# Patient Record
Sex: FEMALE | Race: WHITE | URBAN - METROPOLITAN AREA
[De-identification: names, ages, dates, MRNs, and addresses within clinical notes are randomized per-mention and may not be internally consistent; named-entity substitution may affect disease eponyms.]

---

## 2023-07-06 PROBLEM — M54.50 CHRONIC BILATERAL LOW BACK PAIN WITHOUT SCIATICA: Status: ACTIVE | Noted: 2017-05-30

## 2023-07-06 PROBLEM — S92.502A FRACTURE OF FOURTH TOE, LEFT, CLOSED: Status: ACTIVE | Noted: 2023-07-06

## 2023-07-06 PROBLEM — G62.9 PERIPHERAL NEUROPATHY: Status: ACTIVE | Noted: 2023-07-06

## 2023-07-06 PROBLEM — G89.29 CHRONIC BILATERAL LOW BACK PAIN WITHOUT SCIATICA: Status: ACTIVE | Noted: 2017-05-30

## 2023-07-06 PROBLEM — M25.561 ACUTE PAIN OF RIGHT KNEE: Status: ACTIVE | Noted: 2017-12-08

## 2023-07-06 PROBLEM — M43.07 SPONDYLOLYSIS, LUMBOSACRAL: Status: ACTIVE | Noted: 2023-07-06

## 2023-07-06 PROBLEM — R10.32 LEFT GROIN PAIN: Status: ACTIVE | Noted: 2017-03-28

## 2023-07-06 PROBLEM — E66.9 OBESITY: Status: ACTIVE | Noted: 2023-07-06

## 2023-07-06 PROBLEM — M70.50 PES ANSERINE BURSITIS: Status: ACTIVE | Noted: 2017-12-08

## 2023-07-06 PROBLEM — H10.10 ALLERGIC CONJUNCTIVITIS: Status: ACTIVE | Noted: 2023-07-06

## 2023-07-06 PROBLEM — E55.9 VITAMIN D DEFICIENCY: Status: ACTIVE | Noted: 2023-07-06

## 2023-07-06 PROBLEM — J45.20 INTERMITTENT ASTHMA: Status: ACTIVE | Noted: 2023-07-06

## 2023-07-06 PROBLEM — M43.17 SPONDYLOLISTHESIS AT L5-S1 LEVEL: Status: ACTIVE | Noted: 2023-07-06

## 2023-07-06 RX ORDER — LANOLIN ALCOHOL/MO/W.PET/CERES
100 CREAM (GRAM) TOPICAL DAILY
COMMUNITY
Start: 2022-11-01

## 2023-07-06 RX ORDER — TRANEXAMIC ACID 650 MG/1
TABLET ORAL
COMMUNITY
End: 2023-07-10

## 2023-07-06 RX ORDER — IBUPROFEN 800 MG/1
800 TABLET ORAL EVERY 8 HOURS PRN
COMMUNITY
Start: 2023-05-03

## 2023-07-06 RX ORDER — LORATADINE 10 MG/1
10 TABLET ORAL
COMMUNITY
End: 2023-07-10

## 2023-07-06 RX ORDER — FEXOFENADINE HCL 180 MG/1
180 TABLET ORAL DAILY
COMMUNITY

## 2023-07-06 RX ORDER — CYANOCOBALAMIN 1000 UG/ML
1000 INJECTION, SOLUTION INTRAMUSCULAR; SUBCUTANEOUS
COMMUNITY
Start: 2021-07-08

## 2023-07-10 ENCOUNTER — OFFICE VISIT (OUTPATIENT)
Age: 43
End: 2023-07-10
Payer: COMMERCIAL

## 2023-07-10 VITALS
HEIGHT: 67 IN | DIASTOLIC BLOOD PRESSURE: 81 MMHG | SYSTOLIC BLOOD PRESSURE: 132 MMHG | WEIGHT: 281 LBS | BODY MASS INDEX: 44.1 KG/M2 | TEMPERATURE: 97.7 F | RESPIRATION RATE: 16 BRPM | HEART RATE: 98 BPM

## 2023-07-10 DIAGNOSIS — Z00.00 ENCOUNTER FOR PREVENTIVE HEALTH EXAMINATION: Primary | ICD-10-CM

## 2023-07-10 DIAGNOSIS — E66.01 CLASS 3 SEVERE OBESITY DUE TO EXCESS CALORIES WITH SERIOUS COMORBIDITY AND BODY MASS INDEX (BMI) OF 40.0 TO 44.9 IN ADULT (HCC): ICD-10-CM

## 2023-07-10 DIAGNOSIS — R60.9 EDEMA, UNSPECIFIED TYPE: ICD-10-CM

## 2023-07-10 PROBLEM — G62.9 PERIPHERAL NEUROPATHY: Status: RESOLVED | Noted: 2023-07-06 | Resolved: 2023-07-10

## 2023-07-10 PROCEDURE — 99396 PREV VISIT EST AGE 40-64: CPT | Performed by: FAMILY MEDICINE

## 2023-07-10 RX ORDER — FAMOTIDINE 40 MG/1
40 TABLET, FILM COATED ORAL DAILY
COMMUNITY

## 2023-07-10 RX ORDER — ACETAMINOPHEN 325 MG/1
650 TABLET ORAL EVERY 6 HOURS PRN
COMMUNITY

## 2023-07-10 RX ORDER — SUMATRIPTAN 50 MG/1
50 TABLET, FILM COATED ORAL
COMMUNITY

## 2023-07-10 RX ORDER — FUROSEMIDE 20 MG/1
20 TABLET ORAL DAILY
Qty: 60 TABLET | Refills: 3 | Status: SHIPPED | OUTPATIENT
Start: 2023-07-10

## 2023-07-10 RX ORDER — LANOLIN ALCOHOL/MO/W.PET/CERES
400 CREAM (GRAM) TOPICAL DAILY
COMMUNITY

## 2023-07-10 SDOH — ECONOMIC STABILITY: FOOD INSECURITY: WITHIN THE PAST 12 MONTHS, YOU WORRIED THAT YOUR FOOD WOULD RUN OUT BEFORE YOU GOT MONEY TO BUY MORE.: NEVER TRUE

## 2023-07-10 SDOH — ECONOMIC STABILITY: FOOD INSECURITY: WITHIN THE PAST 12 MONTHS, THE FOOD YOU BOUGHT JUST DIDN'T LAST AND YOU DIDN'T HAVE MONEY TO GET MORE.: NEVER TRUE

## 2023-07-10 SDOH — ECONOMIC STABILITY: HOUSING INSECURITY
IN THE LAST 12 MONTHS, WAS THERE A TIME WHEN YOU DID NOT HAVE A STEADY PLACE TO SLEEP OR SLEPT IN A SHELTER (INCLUDING NOW)?: NO

## 2023-07-10 SDOH — ECONOMIC STABILITY: INCOME INSECURITY: HOW HARD IS IT FOR YOU TO PAY FOR THE VERY BASICS LIKE FOOD, HOUSING, MEDICAL CARE, AND HEATING?: NOT HARD AT ALL

## 2023-07-10 ASSESSMENT — PATIENT HEALTH QUESTIONNAIRE - PHQ9
2. FEELING DOWN, DEPRESSED OR HOPELESS: 0
SUM OF ALL RESPONSES TO PHQ9 QUESTIONS 1 & 2: 0
1. LITTLE INTEREST OR PLEASURE IN DOING THINGS: 0
SUM OF ALL RESPONSES TO PHQ QUESTIONS 1-9: 0

## 2023-07-10 ASSESSMENT — ENCOUNTER SYMPTOMS: BACK PAIN: 1

## 2023-08-09 PROBLEM — Z00.00 ENCOUNTER FOR PREVENTIVE HEALTH EXAMINATION: Status: RESOLVED | Noted: 2023-07-10 | Resolved: 2023-08-09

## 2023-08-23 NOTE — PROGRESS NOTES
[facility]  [address] [facility], [facility]   Phone: [phone]  Fax: [phone]       Date of Visit:  2023  Patient Name: Alfreda Valdovinos   Patient :  1980     CHIEF COMPLAINT:     Alfreda Valdovinos is a 39 y.o. female who presents today for an general visit to be evaluated for the following condition(s):  Chief Complaint   Patient presents with    Knee Pain     Left posterior knee pain--stiff and tight- pain with bending it-no known injury    Foot Pain     Right foot pain-saw Dr Pipe Duvall and MRI completed==>arthritis=will be restarting PT       REVIEW OF SYSTEM      Review of Systems   Constitutional:  Positive for fatigue. Negative for activity change. HENT:  Positive for congestion. Cardiovascular:  Negative for leg swelling. Musculoskeletal:  Positive for arthralgias, back pain and gait problem. Pain of the left knee both medial and posterior as well as right heel and arch that is mild but with swelling and some pain of the right ankle   Hematological:  Bruises/bleeds easily. Psychiatric/Behavioral:  Positive for sleep disturbance. HISTORY OF PRESENT ILLNESS     HPI    39 y/0 female here today for follow lab work. She also wanted to discuss recent consultation she has had with her podiatrist as well as other regarding right foot and ankle pain and swelling in addition to the left knee pain. Lab work was discussed at [facility] and questions were answered. Patient has been seen for some time by her podiatrist.  She has had orthotics made. She is fully aware that the spur of her right foot which was bothering her a great deal changed her gait for  some time. She is attempting to normalize her gait. It was about this time that the right ankle was also having more discomfort and swelling as well as pain of the left knee.       She endorsed that prior to her orthotics she had

## 2023-08-24 ENCOUNTER — OFFICE VISIT (OUTPATIENT)
Age: 43
End: 2023-08-24
Payer: COMMERCIAL

## 2023-08-24 VITALS
HEIGHT: 67 IN | DIASTOLIC BLOOD PRESSURE: 69 MMHG | SYSTOLIC BLOOD PRESSURE: 130 MMHG | WEIGHT: 285.2 LBS | TEMPERATURE: 97.8 F | HEART RATE: 93 BPM | BODY MASS INDEX: 44.76 KG/M2 | RESPIRATION RATE: 16 BRPM

## 2023-08-24 DIAGNOSIS — M25.562 ACUTE PAIN OF LEFT KNEE: Primary | ICD-10-CM

## 2023-08-24 DIAGNOSIS — M19.071 ARTHRITIS OF RIGHT ANKLE: ICD-10-CM

## 2023-08-24 DIAGNOSIS — M77.31 CALCANEAL SPUR OF FOOT, RIGHT: ICD-10-CM

## 2023-08-24 PROCEDURE — 99213 OFFICE O/P EST LOW 20 MIN: CPT | Performed by: FAMILY MEDICINE

## 2023-08-24 RX ORDER — MELOXICAM 15 MG/1
15 TABLET ORAL DAILY
Qty: 30 TABLET | Refills: 3 | Status: SHIPPED | OUTPATIENT
Start: 2023-08-24

## 2023-08-24 ASSESSMENT — LIFESTYLE VARIABLES
HOW MANY STANDARD DRINKS CONTAINING ALCOHOL DO YOU HAVE ON A TYPICAL DAY: 1 OR 2
HOW OFTEN DO YOU HAVE A DRINK CONTAINING ALCOHOL: 2-4 TIMES A MONTH

## 2023-08-24 NOTE — ASSESSMENT & PLAN NOTE
Use the Mobic for 2 weeks minimum . Then as it gets better stop or wean as allowed. Attention to gait and start stretching.    Short arc and quad set as shown

## 2023-08-24 NOTE — PATIENT INSTRUCTIONS
Your gait is what is causing and will cause increase in issues over time. Need to normalize your gait so that you are no rolling you weight across your foot but through the foot from heel to the 2nd toe. This should help equal leg length. Then we need to address the inflammation of the left knee related to walking with slight flexion. Strengthen the anterior portion of the knee with short arc and quad sets and work on the flexibility of the hamstring.       Normalization of gait with the orthotics as well as water or aquatic therapy would be ideal.

## 2023-08-29 PROBLEM — M77.31 CALCANEAL SPUR OF FOOT, RIGHT: Status: ACTIVE | Noted: 2023-08-29

## 2023-08-29 PROBLEM — M19.071 ARTHRITIS OF RIGHT ANKLE: Status: ACTIVE | Noted: 2023-08-29

## 2023-08-29 ASSESSMENT — ENCOUNTER SYMPTOMS: BACK PAIN: 1

## 2023-08-29 NOTE — ASSESSMENT & PLAN NOTE
Discussed the issue of how there is cartilage on the top of the ankle and foot rub. The swelling might continue as you adjust to the change in gait.

## 2023-11-14 NOTE — PROGRESS NOTES
Musculoskeletal:      Comments: Mild warmth of the left knee anterior and lateral in comparison to the right. No significant swelling. No significant medial joint line tenderness however tenderness of the inferolateral and superior portion of the patella. No obvious effusion. Neurological:      Mental Status: She is alert. ASSESSMENT/PLAN     1. Chronic pain of left knee  Assessment & Plan:   Persistent knee pain despite physical therapy and nonsteroidal.  Right foot and ankle seem to have improved however her left knee has not changed. Patient requesting referral to [facility] orthopedics. Her sister and mother had procedures performed by Dr. Cristobal fox and she would prefer to have either him or one of his group evaluate her at this time. Orders:  -     XR KNEE LEFT (1-2 VIEWS); Future  -     MRI KNEE LEFT WO CONTRAST; Future  -     External Referral To Orthopaedic Surgery       Return in about 3 months (around 2/15/2024) for Review progress of knee pain. .    - Questions/concerns answered. Patient verbalized and expressed understanding. Medications, laboratory testing, imaging, consultation, and follow up as documented in this encounter. Please be aware portions of this note were completed using voice recognition software and unforeseen errors may have occurred    I personally reviewed the patient's past medical history, current medications, allergies, surgical history, family history and social history. Updates were made as necessary.     Electronically signed by Brianna Paulson MD on 11/15/2023 at 1:22 PM

## 2023-11-15 ENCOUNTER — OFFICE VISIT (OUTPATIENT)
Age: 43
End: 2023-11-15
Payer: COMMERCIAL

## 2023-11-15 VITALS
OXYGEN SATURATION: 99 % | SYSTOLIC BLOOD PRESSURE: 132 MMHG | WEIGHT: 289.2 LBS | RESPIRATION RATE: 16 BRPM | DIASTOLIC BLOOD PRESSURE: 89 MMHG | HEART RATE: 80 BPM | HEIGHT: 67 IN | BODY MASS INDEX: 45.39 KG/M2 | TEMPERATURE: 98.3 F

## 2023-11-15 DIAGNOSIS — M25.562 CHRONIC PAIN OF LEFT KNEE: Primary | ICD-10-CM

## 2023-11-15 DIAGNOSIS — G89.29 CHRONIC PAIN OF LEFT KNEE: Primary | ICD-10-CM

## 2023-11-15 PROCEDURE — 99213 OFFICE O/P EST LOW 20 MIN: CPT | Performed by: FAMILY MEDICINE

## 2023-11-15 PROCEDURE — G8417 CALC BMI ABV UP PARAM F/U: HCPCS | Performed by: FAMILY MEDICINE

## 2023-11-15 PROCEDURE — 1036F TOBACCO NON-USER: CPT | Performed by: FAMILY MEDICINE

## 2023-11-15 PROCEDURE — G8484 FLU IMMUNIZE NO ADMIN: HCPCS | Performed by: FAMILY MEDICINE

## 2023-11-15 PROCEDURE — G8427 DOCREV CUR MEDS BY ELIG CLIN: HCPCS | Performed by: FAMILY MEDICINE

## 2023-11-15 ASSESSMENT — PATIENT HEALTH QUESTIONNAIRE - PHQ9
SUM OF ALL RESPONSES TO PHQ QUESTIONS 1-9: 0
2. FEELING DOWN, DEPRESSED OR HOPELESS: 0
SUM OF ALL RESPONSES TO PHQ QUESTIONS 1-9: 0
SUM OF ALL RESPONSES TO PHQ QUESTIONS 1-9: 0
SUM OF ALL RESPONSES TO PHQ9 QUESTIONS 1 & 2: 0
1. LITTLE INTEREST OR PLEASURE IN DOING THINGS: 0
SUM OF ALL RESPONSES TO PHQ QUESTIONS 1-9: 0

## 2023-11-15 ASSESSMENT — ENCOUNTER SYMPTOMS
BLOOD IN STOOL: 0
ABDOMINAL PAIN: 0
DIARRHEA: 0
NAUSEA: 1
BACK PAIN: 1

## 2023-11-15 NOTE — ASSESSMENT & PLAN NOTE
Persistent knee pain despite physical therapy and nonsteroidal.  Right foot and ankle seem to have improved however her left knee has not changed. Patient requesting referral to [facility] orthopedics. Her sister and mother had procedures performed by Dr. Brenna fox and she would prefer to have either him or one of his group evaluate her at this time.

## 2023-11-15 NOTE — PATIENT INSTRUCTIONS
Patient is to continue to take the meloxicam and agree with taking the famotidine or Pepcid along with the meloxicam at bedtime to prevent GI side effects. Continue to do the home exercise program.  We will also make the referral to the orthopedic group at [facility] per her preference. We will order plain films in addition to MRI of the left knee.

## 2023-11-22 DIAGNOSIS — M25.562 CHRONIC PAIN OF LEFT KNEE: ICD-10-CM

## 2023-11-22 DIAGNOSIS — G89.29 CHRONIC PAIN OF LEFT KNEE: ICD-10-CM

## 2023-12-27 ENCOUNTER — OFFICE VISIT (OUTPATIENT)
Age: 43
End: 2023-12-27
Payer: COMMERCIAL

## 2023-12-27 VITALS
HEART RATE: 90 BPM | WEIGHT: 287 LBS | TEMPERATURE: 97.1 F | DIASTOLIC BLOOD PRESSURE: 71 MMHG | RESPIRATION RATE: 16 BRPM | SYSTOLIC BLOOD PRESSURE: 135 MMHG | BODY MASS INDEX: 44.95 KG/M2

## 2023-12-27 DIAGNOSIS — H60.393 OTHER INFECTIVE ACUTE OTITIS EXTERNA OF BOTH EARS: ICD-10-CM

## 2023-12-27 DIAGNOSIS — B96.89 ACUTE BACTERIAL SINUSITIS: Primary | ICD-10-CM

## 2023-12-27 DIAGNOSIS — Z78.9 NON-SMOKER: ICD-10-CM

## 2023-12-27 DIAGNOSIS — J01.90 ACUTE BACTERIAL SINUSITIS: Primary | ICD-10-CM

## 2023-12-27 DIAGNOSIS — H57.89 EYE DRAINAGE: ICD-10-CM

## 2023-12-27 PROBLEM — H60.90 OTITIS EXTERNA: Status: ACTIVE | Noted: 2023-12-27

## 2023-12-27 PROCEDURE — 4130F TOPICAL PREP RX AOE: CPT

## 2023-12-27 PROCEDURE — 1036F TOBACCO NON-USER: CPT

## 2023-12-27 PROCEDURE — G8484 FLU IMMUNIZE NO ADMIN: HCPCS

## 2023-12-27 PROCEDURE — 99212 OFFICE O/P EST SF 10 MIN: CPT

## 2023-12-27 PROCEDURE — 99214 OFFICE O/P EST MOD 30 MIN: CPT

## 2023-12-27 PROCEDURE — G8417 CALC BMI ABV UP PARAM F/U: HCPCS

## 2023-12-27 PROCEDURE — G8427 DOCREV CUR MEDS BY ELIG CLIN: HCPCS

## 2023-12-27 RX ORDER — AMOXICILLIN AND CLAVULANATE POTASSIUM 875; 125 MG/1; MG/1
1 TABLET, FILM COATED ORAL 2 TIMES DAILY
Qty: 20 TABLET | Refills: 0 | Status: SHIPPED | OUTPATIENT
Start: 2023-12-27 | End: 2024-01-06

## 2023-12-27 NOTE — PATIENT INSTRUCTIONS
Thank you for coming into the clinic today locate the  -As discussed we will be sending an oral antibiotic called Augmentin for you to take for 10 days. Please call let us know if your sinus infection was not improving after antibiotic course so that we can renew prescriptions for another 10 days. If it continues to not improve we will increase the Augmentin dose and refer you to ENT  -We will be sending an eardrop for you to take for a minimum of 7 days and for up to 10 days if there is no complete resolution after 7 days  - Use claritin or zyrtec OTC for drainage, tylenol for discomfort and/or aleve. Mucinex or robitussin DM for congested cough. Steam inhalation 10-20 minutes one or 2 times a day will help with the congestion more than anything (boil up some water, turn it off, towel over your head, don't burn your face!)   -The best treatment for sinusitis sinus rinses I have attached a copy for you to review at your convenience  -Please feel free to send us any nonurgent medical questions through my portal or call to speak with the on-call physician for more urgent medical

## 2023-12-28 NOTE — ASSESSMENT & PLAN NOTE
-This is likely drainage from clogged sinuses, should be resolved with oral antibiotics, no eyedrops indicated at this time

## 2023-12-28 NOTE — ASSESSMENT & PLAN NOTE
-Prescribed amoxicillin-clavulanate (AUGMENTIN) 875-125 MG per tablet twice daily for 10 days  -Recommended sinus rinses, patient refused this as she is unable to tolerate it  -Recommend steam inhalation  -Started 2 weeks ago with yellow/green mucus

## 2024-01-22 ENCOUNTER — OFFICE VISIT (OUTPATIENT)
Age: 44
End: 2024-01-22
Payer: COMMERCIAL

## 2024-01-22 ENCOUNTER — HOSPITAL ENCOUNTER (OUTPATIENT)
Age: 44
Setting detail: SPECIMEN
Discharge: HOME | End: 2024-01-22

## 2024-01-22 VITALS
HEART RATE: 85 BPM | HEIGHT: 67 IN | DIASTOLIC BLOOD PRESSURE: 86 MMHG | WEIGHT: 287.4 LBS | RESPIRATION RATE: 16 BRPM | TEMPERATURE: 98 F | BODY MASS INDEX: 45.11 KG/M2 | SYSTOLIC BLOOD PRESSURE: 136 MMHG

## 2024-01-22 DIAGNOSIS — E66.01 CLASS 3 SEVERE OBESITY DUE TO EXCESS CALORIES WITHOUT SERIOUS COMORBIDITY WITH BODY MASS INDEX (BMI) OF 40.0 TO 44.9 IN ADULT (HCC): ICD-10-CM

## 2024-01-22 DIAGNOSIS — Z01.810 PRE-OPERATIVE CARDIOVASCULAR EXAMINATION: ICD-10-CM

## 2024-01-22 DIAGNOSIS — M17.12 PRIMARY OSTEOARTHRITIS OF LEFT KNEE: ICD-10-CM

## 2024-01-22 DIAGNOSIS — S83.207A TEAR OF MENISCUS OF LEFT KNEE, UNSPECIFIED MENISCUS, UNSPECIFIED TEAR TYPE, UNSPECIFIED WHETHER OLD OR CURRENT TEAR: ICD-10-CM

## 2024-01-22 DIAGNOSIS — D50.8 IRON DEFICIENCY ANEMIA DUE TO DIETARY CAUSES: ICD-10-CM

## 2024-01-22 DIAGNOSIS — Z98.84 HISTORY OF ROUX-EN-Y GASTRIC BYPASS: ICD-10-CM

## 2024-01-22 DIAGNOSIS — Z01.810 PRE-OPERATIVE CARDIOVASCULAR EXAMINATION: Primary | ICD-10-CM

## 2024-01-22 DIAGNOSIS — D68.2 FACTOR V DEFICIENCY (HCC): ICD-10-CM

## 2024-01-22 LAB
ALBUMIN SERPL-MCNC: 4.4 G/DL (ref 3.5–5.2)
ALBUMIN/GLOB SERPL: 2 {RATIO} (ref 1–2.5)
ALP SERPL-CCNC: 130 U/L (ref 35–104)
ALT SERPL-CCNC: 17 U/L (ref 10–35)
ANION GAP SERPL CALCULATED.3IONS-SCNC: 9 MMOL/L (ref 9–16)
AST SERPL-CCNC: 15 U/L (ref 10–35)
BILIRUB SERPL-MCNC: 0.5 MG/DL (ref 0–1.2)
BUN SERPL-MCNC: 17 MG/DL (ref 6–20)
CALCIUM SERPL-MCNC: 9.3 MG/DL (ref 8.6–10.4)
CHLORIDE SERPL-SCNC: 106 MMOL/L (ref 98–107)
CO2 SERPL-SCNC: 24 MMOL/L (ref 20–31)
CREAT SERPL-MCNC: 0.7 MG/DL (ref 0.5–0.9)
GFR SERPL CREATININE-BSD FRML MDRD: >60 ML/MIN/1.73M2
GLUCOSE SERPL-MCNC: 72 MG/DL (ref 74–99)
POTASSIUM SERPL-SCNC: 4.5 MMOL/L (ref 3.7–5.3)
PROT SERPL-MCNC: 7 G/DL (ref 6.6–8.7)
SODIUM SERPL-SCNC: 139 MMOL/L (ref 136–145)

## 2024-01-22 PROCEDURE — G8482 FLU IMMUNIZE ORDER/ADMIN: HCPCS | Performed by: FAMILY MEDICINE

## 2024-01-22 PROCEDURE — 99214 OFFICE O/P EST MOD 30 MIN: CPT | Performed by: STUDENT IN AN ORGANIZED HEALTH CARE EDUCATION/TRAINING PROGRAM

## 2024-01-22 PROCEDURE — 93005 ELECTROCARDIOGRAM TRACING: CPT | Performed by: STUDENT IN AN ORGANIZED HEALTH CARE EDUCATION/TRAINING PROGRAM

## 2024-01-22 PROCEDURE — G8417 CALC BMI ABV UP PARAM F/U: HCPCS | Performed by: FAMILY MEDICINE

## 2024-01-22 PROCEDURE — G8427 DOCREV CUR MEDS BY ELIG CLIN: HCPCS | Performed by: FAMILY MEDICINE

## 2024-01-22 ASSESSMENT — PATIENT HEALTH QUESTIONNAIRE - PHQ9
SUM OF ALL RESPONSES TO PHQ QUESTIONS 1-9: 0
1. LITTLE INTEREST OR PLEASURE IN DOING THINGS: 0
SUM OF ALL RESPONSES TO PHQ QUESTIONS 1-9: 0
SUM OF ALL RESPONSES TO PHQ9 QUESTIONS 1 & 2: 0
2. FEELING DOWN, DEPRESSED OR HOPELESS: 0
SUM OF ALL RESPONSES TO PHQ QUESTIONS 1-9: 0
SUM OF ALL RESPONSES TO PHQ QUESTIONS 1-9: 0

## 2024-01-22 NOTE — PROGRESS NOTES
difficulty:   Morbid obesity? yes    Anatomically abnormal facies? no   Prominent incisors? no   Receding mandible? no   Neck range of motion: normal   Dentition: No chipped, loose, or missing teeth.    Cardiographics  ECG: normal sinus rhythm, no blocks or conduction defects, no ischemic changes; some artifact present  Echocardiogram: not done    Imaging  No imaging performed    Lab Review   Labs reviewed. HGB wnl. Recent CMP unremarkable.     Assessment:        43 y.o. female with planned left knee arthroscopy.    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 100:1043, 1999), the patient's risk factors for cardiac complications include none, putting her in: RCI RISK CLASS I (0 risk factors, risk of major cardiac compl. appr. 0.5%)    Patient does have morbid obesity, which does increase patient's perioperative risk, but otherwise based on physical examination today, patient's current exercise tolerance, EKG and labwork, patient is low cardiovascular risk to proceed with left total knee arthroplasty. 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       Plan:     Preoperative workup as follows ECG, hemoglobin, electrolytes, creatinine, glucose  Change in medication regimen before surgery: Discontinue offending medications 1 week prior per ortho recommendations  Deep vein thrombosis prophylaxis postoperatively: Per ortho      Resident Attestation:    Patient was seen and discussed with preceptor, Dr. Perez at the time of the encounter.      Gerardo Ro MD PGY-3

## 2024-01-22 NOTE — PATIENT INSTRUCTIONS
Thank you for coming in today, Ethel.  Everything looks good from a cardiac risk perspective prior to surgery.  As discussed, get your labwork done today if able. We will send clearance letter to surgeon  Call or return with any additional questions or concerns!

## 2024-01-23 SDOH — ECONOMIC STABILITY: TRANSPORTATION INSECURITY
IN THE PAST 12 MONTHS, HAS THE LACK OF TRANSPORTATION KEPT YOU FROM MEDICAL APPOINTMENTS OR FROM GETTING MEDICATIONS?: NO

## 2024-01-23 SDOH — ECONOMIC STABILITY: INCOME INSECURITY: IN THE LAST 12 MONTHS, WAS THERE A TIME WHEN YOU WERE NOT ABLE TO PAY THE MORTGAGE OR RENT ON TIME?: NO

## 2024-01-23 SDOH — ECONOMIC STABILITY: TRANSPORTATION INSECURITY
IN THE PAST 12 MONTHS, HAS LACK OF TRANSPORTATION KEPT YOU FROM MEETINGS, WORK, OR FROM GETTING THINGS NEEDED FOR DAILY LIVING?: NO

## 2024-01-25 ENCOUNTER — TELEPHONE (OUTPATIENT)
Dept: FAMILY MEDICINE CLINIC | Age: 44
End: 2024-01-25

## 2024-01-25 NOTE — TELEPHONE ENCOUNTER
Can we please fax most recent office note to Dr. Leigh's office for preoperative clearance? Thank you!

## 2024-02-01 DIAGNOSIS — R74.8 ELEVATED ALKALINE PHOSPHATASE LEVEL: Primary | ICD-10-CM

## 2024-02-08 ENCOUNTER — TELEPHONE (OUTPATIENT)
Age: 44
End: 2024-02-08